# Patient Record
Sex: MALE | Race: WHITE | NOT HISPANIC OR LATINO | Employment: FULL TIME | URBAN - METROPOLITAN AREA
[De-identification: names, ages, dates, MRNs, and addresses within clinical notes are randomized per-mention and may not be internally consistent; named-entity substitution may affect disease eponyms.]

---

## 2020-04-14 ENCOUNTER — HOSPITAL ENCOUNTER (OUTPATIENT)
Dept: RADIOLOGY | Facility: MEDICAL CENTER | Age: 25
End: 2020-04-14
Attending: PHYSICIAN ASSISTANT
Payer: COMMERCIAL

## 2020-04-14 ENCOUNTER — OCCUPATIONAL MEDICINE (OUTPATIENT)
Dept: URGENT CARE | Facility: PHYSICIAN GROUP | Age: 25
End: 2020-04-14
Payer: COMMERCIAL

## 2020-04-14 VITALS
WEIGHT: 280 LBS | BODY MASS INDEX: 37.93 KG/M2 | DIASTOLIC BLOOD PRESSURE: 78 MMHG | OXYGEN SATURATION: 99 % | HEART RATE: 89 BPM | TEMPERATURE: 99 F | SYSTOLIC BLOOD PRESSURE: 144 MMHG | HEIGHT: 72 IN

## 2020-04-14 DIAGNOSIS — M79.641 RIGHT HAND PAIN: ICD-10-CM

## 2020-04-14 DIAGNOSIS — S67.21XA CRUSHING INJURY OF RIGHT HAND, INITIAL ENCOUNTER: ICD-10-CM

## 2020-04-14 PROCEDURE — 73130 X-RAY EXAM OF HAND: CPT | Mod: RT

## 2020-04-14 PROCEDURE — 99203 OFFICE O/P NEW LOW 30 MIN: CPT | Mod: 29 | Performed by: PHYSICIAN ASSISTANT

## 2020-04-14 NOTE — PATIENT INSTRUCTIONS
Compartment Syndrome  You have an injury to the arm or leg with significant deep bruising and swelling. Fractures and muscle bruises cause bleeding in the deep tissues. This can increase the pressure in the injured forearm, leg, hand, or foot. If untreated, the pressure from the bleeding and swelling will interfere with the circulation. If this happens, there is increased pain, numbness, coldness, and a pale color to the hand or foot. Permanent damage to the muscle can occur within 4 to 8 hours if this goes untreated.  This is called a compartment syndrome. It needs immediate medical attention. If the pressure is very high, surgical treatment may be needed. Keep your injured extremity at the level of your heart. Use ice packs as directed by your caregiver.   SEEK IMMEDIATE MEDICAL CARE IF:  · You develop extreme pain unrelieved by pain medicine, ice, and elevation.   · You develop numbness or paralysis (inability to move fingers, toes or ankles) of the hand or foot of the injured extremity.   Document Released: 01/25/2006 Document Revised: 03/11/2013 Document Reviewed: 06/15/2010  [employer]® Patient Information ©2013 [employer], [place].

## 2020-04-14 NOTE — PROGRESS NOTES
Subjective:   Stephen Paredes is a 25 y.o. male who presents for Hand Injury (smashed right hand/ WC )         Date of Injury:  4/14/2020.  Patient complains of right hand pain x1 day.  Symptoms began after the hand was smashed between a cabinet fender while he was lowering a cab.  States that hand was bent at a strange angle.  He has numbness over the area that was smashed.  He denies distal numbness and tingling.  Denies loss of distal sensation.  Wrist is nonpainful.  Range of motion of the hand is significantly restricted.  Pain is currently a 5 out of 10.  He applied ice and took some Advil which did improve both his pain and swelling.  Denies prior injury to the affected hand.  Patient is left-hand dominant.      Review of Systems   Musculoskeletal:        Hand pain and swelling   Neurological:        Numbness over back of hand       PMH: No pertinent past medical history to this problem  MEDS: Medications were reviewed in Epic  ALLERGIES: Allergies were reviewed in Epic  SOCHX: Works at [employer]  FH: No pertinent family history to this problem     Objective:   /78 (BP Location: Left arm, Patient Position: Sitting, BP Cuff Size: Large adult)   Pulse 89   Temp 37.2 °C (99 °F) (Temporal)   Ht 1.829 m (6')   Wt (!) 127 kg (280 lb)   SpO2 99%   BMI 37.97 kg/m²   Physical Exam  Vitals signs reviewed.   Constitutional:       General: He is not in acute distress.     Appearance: Normal appearance. He is well-developed. He is not toxic-appearing.   HENT:      Head: Normocephalic and atraumatic.      Right Ear: External ear normal.      Left Ear: External ear normal.      Nose: Nose normal.   Eyes:      General: Lids are normal.      Conjunctiva/sclera: Conjunctivae normal.   Neck:      Musculoskeletal: Neck supple.   Cardiovascular:      Rate and Rhythm: Normal rate and regular rhythm.   Pulmonary:      Effort: Pulmonary effort is normal. No respiratory distress.      Breath sounds: Normal breath sounds.    Musculoskeletal:      Comments: Right wrist/hand  General: 8mm superficial abrasion on the dorsal aspect.  Diffuse dorsal swelling and mild erythema of the hand.  Significant edema of the thenar eminence. Palpation: Hand diffusely, moderately tender to palpation.  Fingers nontender to palpation.  Wrist nontender to palpation.  ROM: Hand range of motion moderately limited.    Neuro: sensation over dorsal aspect slightly decreased. Otherwise sensation in hand intact and comparable to unaffected hand. Radial, median, ulnar nerves intact.  Vascular: radial pulses 2+ and symmetric, cap refill <2 sec     Skin:     General: Skin is warm and dry.      Capillary Refill: Capillary refill takes less than 2 seconds.   Neurological:      Mental Status: He is alert and oriented to person, place, and time.      Cranial Nerves: No cranial nerve deficit.      Sensory: No sensory deficit.   Psychiatric:         Speech: Speech normal.         Behavior: Behavior normal.         Thought Content: Thought content normal.         Judgment: Judgment normal.          Assessment/Plan:   1. Crushing injury of right hand, initial encounter  - REFERRAL TO ORTHOPEDICS    2. Right hand pain  - DX-HAND 3+ RIGHT; Future  - REFERRAL TO ORTHOPEDICS    Patient is neurovascularly intact distally.  No evidence of fracture on x-ray.  However patient has significant soft tissue swelling.  Additionally, given described injury I am concerned about possible ligament to this or soft tissue injury.  Signs and symptoms of compartment syndrome discussed at length with patient.  He verbalized good understanding of these.  Should he develop any of these he is to go to the ER immediately for reevaluation.  I would like patient to follow-up with a hand specialist for reevaluation.  Urgent referral placed.    Patient placed in volar splint for comfort and a sling to help control swelling.  I would like him to keep elevated and ice frequently.  600 mg of ibuprofen  3-4 times a day as needed for pain and swelling.  Patient to return to clinic in 2 days for reevaluation.    Differential diagnosis, natural history, supportive care, and indications for immediate follow-up discussed.

## 2020-04-14 NOTE — LETTER
EMPLOYEE’S CLAIM FOR COMPENSATION/ REPORT OF INITIAL TREATMENT  FORM C-4    EMPLOYEE’S CLAIM - PROVIDE ALL INFORMATION REQUESTED   First Name  Stephen Last Name  Paredes Birthdate                    1995                Sex  male Claim Number   Home Address  [address] Age  25 y.o. 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 (if applicable)  Troubleshooting Equipment    How did this injury or occupational disease occur? (Be specific an answer in detail. Use additional sheet if necessary)  Was lowering cab and had hand in way of cab smashing hand between cab and fender   If you believe that you have an occupational disease, when did you first have knowledge of the disability and it relationship to your employment?  na Witnesses to the Accident  none      Nature of Injury or Occupational Disease  Workers' Compensation  Part(s) of Body Injured or Affected  Hand (R), N/A, N/A    I certify that the above is true and correct to the best of my knowledge and that I have provided this information in order to obtain the benefits of [place]  I hereby authorize any physician, chiropractor, surgeon, practitioner, or other person, any hospital, including [facility], any medical service organization, any insurance company, or other institution or organization to release to each other, any medical or other information, including benefits paid or payable, pertinent to this injury or disease, except information relative to diagnosis, treatment and/or counseling for AIDS, psychological conditions, alcohol or controlled substances, for which I must give specific authorization.  A Photostat of this authorization shall be as valid as the original.     Date   Place   Employee’s Signature   THIS REPORT MUST BE COMPLETED AND MAILED WITHIN 3 WORKING DAYS OF TREATMENT   Place  [facility]  Name of Facility  [place]   Date  4/14/2020 Diagnosis  (S67.21XA) Crushing injury of right hand, initial encounter  (M79.641) Right hand pain Is there evidence the injured employee was under the influence of alcohol and/or another controlled substance at the time of accident?   Hour  3:15 PM Description of Injury or Disease  Diagnoses of Crushing injury of right hand, initial encounter and Right hand pain were pertinent to this visit. No   Treatment  Splint, sling, strict ED precautions, work restrictions. Ortho eval.  Have you advised the patient to remain off work five days or more?     X-Ray Findings  Negative   If Yes   From Date  To Date      From information given by the employee, together with medical evidence, can you directly connect this injury or occupational disease as job incurred?  Yes If No Full Duty No Modified Duty  Yes   Is additional medical care by a physician indicated?  Yes If Modified Duty, Specify any Limitations / Restrictions  May not use right hand/arm.   Do you know of any previous injury or disease contributing to this condition  "or occupational disease?                            No   Date  4/14/2020 Print Doctor’s Name Hung Lyn P.A.-C. I certify the employer’s copy of  this form was mailed on:   Address  [address] Insurer’s Use Only     [facility] Zip  [address]    Provider’s Tax ID Number  [id number] Telephone  Dept: [phone]            e-Signature: Dr. Rodrick Rajput,   Medical Director Degree  MD        ORIGINAL-TREATING PHYSICIAN OR CHIROPRACTOR    PAGE 2-INSURER/TPA    PAGE 3-EMPLOYER    PAGE 4-EMPLOYEE             Form C-4 (rev.10/07)              BRIEF DESCRIPTION OF RIGHTS AND BENEFITS  (Pursuant to [place] 616C.[place])    Notice of Injury or Occupational Disease (Incident Report Form C-1): If an injury or occupational disease (OD) arises out of and in the course of employment, you must provide written notice to your employer as soon as practicable, but no later than 7 days after the accident or OD. Your employer shall maintain a sufficient supply of the required forms.    Claim for Compensation (Form C-4): If medical treatment is sought, the form C-4 is available at the place of initial treatment. A completed \"Claim for Compensation\" (Form C-4) must be filed within 90 days after an accident or OD. The treating physician or chiropractor must, within 3 working days after treatment, complete and mail to the employer, the employer's insurer and third-party , the Claim for Compensation.    Medical Treatment: If you require medical treatment for your on-the-job injury or OD, you may be required to select a physician or chiropractor from a list provided by your workers’ compensation insurer, if it has contracted with an Organization for Managed Care (MCO) or Preferred Provider Organization (PPO) or providers of health care. If your employer has not entered into a contract with an MCO or PPO, you may select a physician or chiropractor from the Panel of Physicians and Chiropractors. Any medical costs related " to your industrial injury or OD will be paid by your insurer.    Temporary Total Disability (TTD): If your doctor has certified that you are unable to work for a period of at least 5 consecutive days, or 5 cumulative days in a 20-day period, or places restrictions on you that your employer does not accommodate, you may be entitled to TTD compensation.    Temporary Partial Disability (TPD): If the wage you receive upon reemployment is less than the compensation for TTD to which you are entitled, the insurer may be required to pay you TPD compensation to make up the difference. TPD can only be paid for a maximum of 24 months.    Permanent Partial Disability (PPD): When your medical condition is stable and there is an indication of a PPD as a result of your injury or OD, within 30 days, your insurer must arrange for an evaluation by a rating physician or chiropractor to determine the degree of your PPD. The amount of your PPD award depends on the date of injury, the results of the PPD evaluation and your age and wage.    Permanent Total Disability (PTD): If you are medically certified by a treating physician or chiropractor as permanently and totally disabled and have been granted a PTD status by your insurer, you are entitled to receive monthly benefits not to exceed 66 2/3% of your average monthly wage. The amount of your PTD payments is subject to reduction if you previously received a PPD award.    Vocational Rehabilitation Services: You may be eligible for vocational rehabilitation services if you are unable to return to the job due to a permanent physical impairment or permanent restrictions as a result of your injury or occupational disease.    Transportation and Per Miranda Reimbursement: You may be eligible for travel expenses and per miranda associated with medical treatment.    Reopening: You may be able to reopen your claim if your condition worsens after claim closure.    Appeal Process: If you disagree with a  written determination issued by the insurer or the insurer does not respond to your request, you may appeal to the Department of Administration, , by following the instructions contained in your determination letter. You must appeal the determination within 70 days from the date of the determination letter at [address], [address], [place], [place] [address], or [address], [address], [place], [place] [address]. If you disagree with the  decision, you may appeal to the Department of Administration, . You must file your appeal within 30 days from the date of the  decision letter at [address], [address], [place], [place] [address], or [address], [address], [place], [place] [address]. If you disagree with a decision of an , you may file a petition for judicial review with the District Court. You must do so within 30 days of the Appeal Officer’s decision. You may be represented by an  at your own expense or you may contact the [facility] for possible representation.    [place]  for Injured Workers (NAIW): If you disagree with a  decision, you may request that NAIW represent you without charge at an  Hearing. For information regarding denial of benefits, you may contact the [facility] at: [address], [address], [place] [address], (570) [phone], or [address], [address], [place] [address], [phone]    To File a Complaint with the Division: If you wish to file a complaint with the  of the Division of Industrial Relations (DIR),  please contact the Workers’ Compensation Section, [address], [place], [place] [address], telephone (588) [phone], or [address], [address], [place], [place] [address], telephone ([phone]) [phone].    For assistance with Workers’ Compensation Issues: You may contact the Office of  the [facility] Consumer Health Assistance, [address] [place] [address], [address], [place] [address], Toll Free 1-702.830.8122, Web site: http://govcha.[facility].[place].us, E-mail ama@[email or web address].nv.                   __________________________________________________________________                                                     _________        Employee Name / Signature                                                                                                                                              Date                                                                                                                                                                                                     D-2 (rev. 06/18)

## 2020-04-14 NOTE — LETTER
[facility]  [address] [facility] [place] [address]  Phone:  [phone] - Fax:  [phone]   [facility] Progress Report and Disability Certification  Date of Service: 4/14/2020   No Show:  No  Date / Time of Next Visit: 4/16/2020   Claim Information   Patient Name: Stephen Paredes  Claim Number:     Employer:   [employer] Date of Injury: 4/14/2020     Insurer / TPA:    ID / SSN:     Occupation: TECHNICIAN  Diagnosis: Diagnoses of Crushing injury of right hand, initial encounter and Right hand pain were pertinent to this visit.    Medical Information   Related to Industrial Injury? Yes    Subjective Complaints:  Date of Injury:  4/14/2020.  Patient complains of right hand pain x1 day.  Symptoms began after the hand was smashed between a cabinet fender while he was lowering a cab.  States that hand was bent at an unnatural angle.  He has numbness over the area that was smashed.  He denies distal numbness and tingling.  Denies loss of distal sensation.  Wrist is nonpainful.  Range of motion of the hand is significantly restricted.  Pain is currently a 5 out of 10.  He applied ice and took some Advil which did improve his symptoms.  Denies prior injury to the affected hand.  Patient is left-hand dominant.     Objective Findings: Right wrist/hand  General: 8mm superficial abrasion on the dorsal aspect.  Diffuse dorsal swelling and mild erythema of the hand.  Significant edema of the thenar eminence. Palpation: Hand diffusely, moderately tender to palpation.  Fingers nontender to palpation.  Wrist nontender to palpation.  ROM: Hand range of motion moderately limited.    Neuro: sensation over dorsal aspect slightly decreased. Otherwise sensation in hand intact and comparable to unaffected hand. Radial, median, ulnar nerves intact.  Vascular: radial pulses 2+ and symmetric, cap refill <2 sec   Pre-Existing Condition(s):     Assessment:   Initial Visit    Status: Additional Care  Required  Comments:transfer of care to ortho  Permanent Disability:No    Plan:      Diagnostics: X-ray  Comments:negative    Comments:  Patient is neurovascularly intact distally.  No evidence of fracture on x-ray.  However patient has significant soft tissue swelling.  Additionally, given described injury I am concerned about possible ligamentous or other soft tissue injury.  Signs   and symptoms of compartment syndrome discussed at length with patient.  He verbalized good understanding of these.  Should he develop any of these he is to go to the ER immediately for reevaluation.  I would like patient to follow-up with a hand spec  ialist for reevaluation.  Referral placed.    Patient placed in volar splint for comfort and a sling to help control swelling.  I would like him to keep elevated and ice frequently.  600 mg of ibuprofen 3-4 times a day as needed for pain and swelling  .  Patient to return to clinic in 2 days for reevaluation.      Disability Information   Status: Released to Restricted Duty    From:  4/14/2020  Through: 4/16/2020 Restrictions are: Temporary   Physical Restrictions   Sitting:    Standing:    Stooping:    Bending:      Squatting:    Walking:    Climbing:    Pushing:      Pulling:    Other:    Reaching Above Shoulder (L):   Reaching Above Shoulder (R):       Reaching Below Shoulder (L):    Reaching Below Shoulder (R):      Not to exceed Weight Limits   Carrying(hrs):   Weight Limit(lb):   Lifting(hrs):   Weight  Limit(lb):     Comments: No use of right hand and arm for work related tasks. Wear sling while at work. Must be allowed to elevate and ice as needed.    Repetitive Actions   Hands: i.e. Fine Manipulations from Grasping:     Feet: i.e. Operating Foot Controls:     Driving / Operate Machinery:     Physician Name: Hung Lyn P.A.-C. Physician Signature:   e-Signature: Dr. Rodrick Rajput, Medical Director   Clinic Name / Location: [facility] [address]  [address] Clinic Phone Number: Dept: [phone]   Appointment Time: 2:55 Pm Visit Start Time: 3:15 PM   Check-In Time:  3:07 Pm Visit Discharge Time:  4:34 PM   Original-Treating Physician or Chiropractor    Page 2-Insurer/TPA    Page 3-Employer    Page 4-Employee

## 2020-04-16 ENCOUNTER — OCCUPATIONAL MEDICINE (OUTPATIENT)
Dept: URGENT CARE | Facility: PHYSICIAN GROUP | Age: 25
End: 2020-04-16
Payer: COMMERCIAL

## 2020-04-16 VITALS
BODY MASS INDEX: 37.93 KG/M2 | HEART RATE: 61 BPM | RESPIRATION RATE: 15 BRPM | SYSTOLIC BLOOD PRESSURE: 128 MMHG | HEIGHT: 72 IN | DIASTOLIC BLOOD PRESSURE: 88 MMHG | WEIGHT: 280 LBS | OXYGEN SATURATION: 100 % | TEMPERATURE: 97.3 F

## 2020-04-16 DIAGNOSIS — S67.21XD CRUSHING INJURY OF RIGHT HAND, SUBSEQUENT ENCOUNTER: ICD-10-CM

## 2020-04-16 PROCEDURE — 99213 OFFICE O/P EST LOW 20 MIN: CPT | Performed by: NURSE PRACTITIONER

## 2020-04-16 RX ORDER — ACETAMINOPHEN 325 MG/1
650 TABLET ORAL EVERY 4 HOURS PRN
COMMUNITY

## 2020-04-16 ASSESSMENT — ENCOUNTER SYMPTOMS
SENSORY CHANGE: 1
CONSTITUTIONAL NEGATIVE: 1
FOCAL WEAKNESS: 0

## 2020-04-16 ASSESSMENT — PAIN SCALES - GENERAL: PAINLEVEL: 5=MODERATE PAIN

## 2020-04-16 NOTE — LETTER
"   [facility]  [address]  [place] [address]  Phone:  [phone] - Fax:  [phone]   [facility] Progress Report and Disability Certification  Date of Service: 4/16/2020   No Show:  No  Date / Time of Next Visit: 4/21/2020   Claim Information   Patient Name: Stephen Paredes  Claim Number:     Employer: [employer]  Date of Injury: 4/14/2020     Insurer / TPA: [employer] [facility]  ID / SSN:     Occupation: TECHNICIAN  Diagnosis: The encounter diagnosis was Crushing injury of right hand, subsequent encounter.    Medical Information   Related to Industrial Injury? Yes    Subjective Complaints:  Initial visit  4/14/2020:    \"Date of Injury:  4/14/2020.  Patient complains of right hand pain x1 day.  Symptoms began after the hand was smashed between a cabinet fender while he was lowering a cab.  States that hand was bent at a strange angle.  He has numbness over the area that was smashed.  He denies distal numbness and tingling.  Denies loss of distal sensation.  Wrist is nonpainful.  Range of motion of the hand is significantly restricted.  Pain is currently a 5 out of 10.  He applied ice and took some Advil which did improve both his pain and swelling.  Denies prior injury to the affected hand.  Patient is left-hand dominant.\" - KAYLEEN Lyn    Follow-up visit  today 4/16/2020:    Condition mostly unchanged. Pain and swelling unchanged. Reports new numbness of his right thumb. Has been working within restrictions using splint. Has taken Tylenol for pain. Still awaiting hand specialist.   Objective Findings: Constitutional:       General: He is not in acute distress.     Appearance: He is well-developed. He is not toxic-appearing or diaphoretic.   Cardiovascular:      Rate and Rhythm: Normal rate.      Pulses: Normal pulses.   Musculoskeletal:         General: No deformity.      Right wrist: Normal. He exhibits normal range of motion and no " tenderness.      Right hand: He exhibits decreased range of motion, tenderness, laceration (Healing, small abrasion at dorsal R hand) and swelling. He exhibits normal capillary refill and no deformity. Decreased sensation (R thumb) noted. Decreased strength noted.   Skin:     General: Skin is warm and dry.      Capillary Refill: Capillary refill takes less than 2 seconds.      Coloration: Skin is not pale.   Neurological:      General: No focal deficit present.      Mental Status: He is alert and oriented to person, place, and time.      Motor: Motor function is intact.   Pre-Existing Condition(s):     Assessment:   Condition Same    Status: Additional Care Required  Permanent Disability:No    Plan: Consultation    Diagnostics:      Comments:  Condition essentially unchanged. Continue with work restrictions.  May use OTC ibuprofen, ice, and elevation to help with swelling and pain.  Follow-up with hand specialist, referral pending.  Follow-up in clinic in 5 days.  Return sooner if symptoms   change or worsen.    Disability Information   Status: Released to Restricted Duty    From:  2020  Through: 2020 Restrictions are: Temporary   Physical Restrictions   Sitting:    Standing:    Stooping:    Bending:      Squatting:    Walking:    Climbing:    Pushin hrs/day   Pullin hrs/day Other:    Reaching Above Shoulder (L):   Reaching Above Shoulder (R):       Reaching Below Shoulder (L):    Reaching Below Shoulder (R):      Not to exceed Weight Limits   Carrying(hrs):   Weight Limit(lb): < or = to 10 pounds Lifting(hrs):   Weight  Limit(lb): < or = to 10 pounds   Comments: Restrictions apply to R hand only; must use splint and sling at all times    Repetitive Actions   Hands: i.e. Fine Manipulations from [place] hrs/day   Feet: i.e. Operating Foot Controls:     Driving / Operate Machinery:     Physician Name: RICHELLE Foss Physician Signature: BARBARA Donaldson  e-Signature: Dr. Rodrick Rajput, Medical Director   Clinic Name / Location: [address] [place] [address] Clinic Phone Number: Dept: [phone]   Appointment Time: 8:00 Am Visit Start Time: 8:07 AM   Check-In Time:  7:43 Am Visit Discharge Time: 8:40 AM   Original-Treating Physician or Chiropractor    Page 2-Insurer/TPA    Page 3-Employer    Page 4-Employee

## 2020-04-16 NOTE — PROGRESS NOTES
"Subjective:     Stephen Paredes is a 25 y.o. male who presents for Follow-Up ()    Initial visit  4/14/2020:    \"Date of Injury:  4/14/2020.  Patient complains of right hand pain x1 day.  Symptoms began after the hand was smashed between a cabinet fender while he was lowering a cab.  States that hand was bent at a strange angle.  He has numbness over the area that was smashed.  He denies distal numbness and tingling.  Denies loss of distal sensation.  Wrist is nonpainful.  Range of motion of the hand is significantly restricted.  Pain is currently a 5 out of 10.  He applied ice and took some Advil which did improve both his pain and swelling.  Denies prior injury to the affected hand.  Patient is left-hand dominant.\" - KAYLEEN Lyn    Follow-up visit  today 4/16/2020:    Condition mostly unchanged. Pain and swelling unchanged. Reports new numbness of his right thumb. Has been working within restrictions using splint. Has taken Tylenol for pain. Still awaiting hand specialist.    PMH: No pertinent past medical history to this problem  MEDS: Medications were reviewed in Epic  ALLERGIES: Allergies were reviewed in Epic  SOCHX: Works as a technician at [employer]   FH: No pertinent family history to this problem    Review of Systems   Constitutional: Negative.    Musculoskeletal:        R hand pain, swelling   Neurological: Positive for sensory change. Negative for focal weakness.   All other systems reviewed and are negative.    Objective:     /88   Pulse 61   Temp 36.3 °C (97.3 °F)   Resp 15   Ht 1.829 m (6')   Wt (!) 127 kg (280 lb)   SpO2 100%   BMI 37.97 kg/m²     Physical Exam  Vitals signs reviewed.   Constitutional:       General: He is not in acute distress.     Appearance: He is well-developed. He is not toxic-appearing or diaphoretic.   HENT:      Head: Normocephalic.      Right Ear: External ear normal.      Left Ear: External ear normal.      Nose: Nose normal.   Eyes:      Extraocular Movements: " Extraocular movements intact.      Conjunctiva/sclera: Conjunctivae normal.      Pupils: Pupils are equal, round, and reactive to light.   Neck:      Musculoskeletal: Normal range of motion.   Cardiovascular:      Rate and Rhythm: Normal rate.      Pulses: Normal pulses.   Pulmonary:      Effort: Pulmonary effort is normal. No respiratory distress.   Musculoskeletal:         General: No deformity.      Right wrist: Normal. He exhibits normal range of motion and no tenderness.      Right hand: He exhibits decreased range of motion, tenderness, laceration (Healing, small abrasion at dorsal R hand) and swelling. He exhibits normal capillary refill and no deformity. Decreased sensation (R thumb) noted. Decreased strength noted.   Skin:     General: Skin is warm and dry.      Capillary Refill: Capillary refill takes less than 2 seconds.      Coloration: Skin is not pale.   Neurological:      General: No focal deficit present.      Mental Status: He is alert and oriented to person, place, and time.      Motor: Motor function is intact.   Psychiatric:         Behavior: Behavior normal. Behavior is cooperative.       Assessment/Plan:     1. Crushing injury of right hand, subsequent encounter    Splint removed. Hand examined. Hand cleaned. Padding replaced. Splint reapplied. Elastic bandage replaced.    Condition essentially unchanged. Continue with work restrictions.  May use OTC ibuprofen, ice, and elevation to help with swelling and pain.  Follow-up with hand specialist, referral pending.  Follow-up in clinic in 5 days.  Return sooner if symptoms   change or worsen.    Patient advised to: Return in about 5 days (around 4/21/2020).    Differential diagnosis, natural history, supportive care, and indications for immediate follow-up discussed. All questions answered. Patient agrees with the plan of care.

## 2020-04-21 ENCOUNTER — OCCUPATIONAL MEDICINE (OUTPATIENT)
Dept: URGENT CARE | Facility: CLINIC | Age: 25
End: 2020-04-21
Payer: COMMERCIAL

## 2020-04-21 VITALS
WEIGHT: 285.94 LBS | DIASTOLIC BLOOD PRESSURE: 84 MMHG | SYSTOLIC BLOOD PRESSURE: 128 MMHG | OXYGEN SATURATION: 97 % | RESPIRATION RATE: 14 BRPM | HEART RATE: 80 BPM | BODY MASS INDEX: 38.73 KG/M2 | HEIGHT: 72 IN

## 2020-04-21 DIAGNOSIS — S67.21XD CRUSHING INJURY OF RIGHT HAND, SUBSEQUENT ENCOUNTER: ICD-10-CM

## 2020-04-21 PROCEDURE — 99213 OFFICE O/P EST LOW 20 MIN: CPT | Performed by: NURSE PRACTITIONER

## 2020-04-21 ASSESSMENT — ENCOUNTER SYMPTOMS
WEAKNESS: 1
SENSORY CHANGE: 1
MYALGIAS: 1
CARDIOVASCULAR NEGATIVE: 1
PSYCHIATRIC NEGATIVE: 1
RESPIRATORY NEGATIVE: 1
CONSTITUTIONAL NEGATIVE: 1

## 2020-04-21 ASSESSMENT — PAIN SCALES - GENERAL: PAINLEVEL: 2=MINIMAL-SLIGHT

## 2020-04-21 NOTE — LETTER
[facility]  [address], [address] [place] [address]  Phone:  [phone] - Fax:  [phone]   [facility] Progress Report and Disability Certification  Date of Service: 4/21/2020   No Show:  No  Date / Time of Next Visit:   Discharged / Care Transfer to Hand Surgery   Claim Information   Patient Name: Stephen Paredes  Claim Number:     Employer: [employer]  Date of Injury: 4/14/2020     Insurer / TPA: [employer]  ID / SSN:     Occupation: TECHNICIAN  Diagnosis: The encounter diagnosis was Crushing injury of right hand, subsequent encounter.    Medical Information   Related to Industrial Injury? Yes    Subjective Complaints:  Date of Injury:  4/14/2020.  Patient complains of right hand pain x1 day.  Symptoms began after the hand was smashed between a cabinet fender while he was lowering a cab.  States that hand was bent at a strange angle.  He has numbness over the area that was smashed.  He denies distal numbness and tingling.  Denies loss of distal sensation.  Wrist is nonpainful.  Range of motion of the hand is significantly restricted.  Pain is currently a 5 out of 10.  He applied ice and took some Advil which did improve both his pain and swelling.  Denies prior injury to the affected hand.  Patient is left-hand dominant.    Today no improvement.  Pain is described as sharp, radiating, and throbbing.  Patient also reports numbness in his thumb palm on his right hand. Patient has no pertinent negatives at this time. Patient is taking Tylenol and using ice with mild to moderate relief of symptoms. Patient is wearing an orthoglass splint with mild relief. I suspect patient may have some tendon damage. Patient has been referred to Dr. Keller for further follow-up and management. Patient has been tolerating work restrictions at this time. Plan of care discussed with patient.    Objective Findings: Right hand: Pos decreased range of  motion, moderate tenderness, laceration (Healing, small abrasion at dorsal R hand) and swelling. Capillary refill brisk. No obvious deformity. Pos decreased sensation (R thumb) noted. decreased strength noted overall.   Pre-Existing Condition(s):     Assessment:   Condition Same    Status: Discharged / Care Transfer  Permanent Disability:     Plan: Transfer Care    Diagnostics:      Comments:  Follow-up in 2 weeks, if not seen by Dr. Keller  Transfer care to hand surgery, referral placed and pending  Restricted duty, per hand surgery  Continue with OTC Tylenol as needed for symptom relief  Continue with ice and elevation as needed  Continue   with mild gentle range of motion and stretching exercises as tolerated        Disability Information   Status: Released to Restricted Duty    From:  2020  Through:   Restrictions are: Temporary   Physical Restrictions   Sitting:    Standing:    Stooping:    Bending:      Squatting:    Walking:    Climbing:    Pushin hrs/day  Comments:No use of right hand   Pullin hrs/day  Comments:No use of right hand Other:    Reaching Above Shoulder (L):   Reaching Above Shoulder (R):       Reaching Below Shoulder (L):    Reaching Below Shoulder (R):      Not to exceed Weight Limits   Carrying(hrs):   Weight Limit(lb):   Comments:No use of right hand Lifting(hrs):   Weight  Limit(lb):   Comments:No use of right hand   Comments:      Repetitive Actions   Hands: i.e. Fine Manipulations from [place] hrs/day  Comments:No use of right hand   Feet: i.e. Operating Foot Controls:     Driving / Operate Machinery:     Physician Name: RICHELLE Claudio Physician Signature:   e-Signature: Dr. Rodrick Rajput, Medical Director   Clinic Name / Location: [address], [address] [address] Clinic Phone Number: Dept: [phone]   Appointment Time: 7:30 Am Visit Start Time: 7:34 AM   Check-In Time:  7:34 Am Visit Discharge Time: 7:56 am       Original-Treating Physician or Chiropractor    Page 2-Insurer/TPA    Page 3-Employer    Page 4-Employee

## 2020-04-21 NOTE — PROGRESS NOTES
Subjective:      Stephen Paredes is a 25 y.o. male who presents with Other (WC DOI 04/14/2020 Rt Hand - Better - RM 06)      Date of Injury:  4/14/2020.  Patient complains of right hand pain x1 day.  Symptoms began after the hand was smashed between a cabinet fender while he was lowering a cab.  States that hand was bent at a strange angle.  He has numbness over the area that was smashed.  He denies distal numbness and tingling.  Denies loss of distal sensation.  Wrist is nonpainful.  Range of motion of the hand is significantly restricted.  Pain is currently a 5 out of 10.  He applied ice and took some Advil which did improve both his pain and swelling.  Denies prior injury to the affected hand.  Patient is left-hand dominant.    Today no improvement.  Pain is described as sharp, radiating, and throbbing.  Patient also reports numbness in his thumb palm on his right hand. Patient has no pertinent negatives at this time. Patient is taking Tylenol and using ice with mild to moderate relief of symptoms. Patient is wearing an orthoglass splint with mild relief. I suspect patient may have some tendon damage. Patient has been referred to Dr. Keller for further follow-up and management. Patient has been tolerating work restrictions at this time. Plan of care discussed with patient.      HPI    Review of Systems   Constitutional: Negative.    Respiratory: Negative.    Cardiovascular: Negative.    Musculoskeletal: Positive for joint pain and myalgias.   Skin:        Swelling, bruising, and small abrasion healing   Neurological: Positive for sensory change and weakness.   Psychiatric/Behavioral: Negative.         ROS: All systems were reviewed on intake form, form was reviewed and signed. See scanned documents in media. Pertinent positives and negatives included in HPI.    PMH: No pertinent past medical history to this problem  MEDS: Medications were reviewed in Epic  ALLERGIES:   Allergies   Allergen Reactions   • Amoxicillin     • Penicillins      SOCHX: Works as  at [employer]  FH: No pertinent family history to this problem   Objective:     /84   Pulse 80   Resp 14   Ht 1.829 m (6')   Wt (!) 129.7 kg (285 lb 15 oz)   SpO2 97%   BMI 38.78 kg/m²      Physical Exam  Constitutional:       General: He is not in acute distress.     Appearance: Normal appearance. He is not ill-appearing.   Cardiovascular:      Rate and Rhythm: Normal rate and regular rhythm.      Pulses: Normal pulses.   Pulmonary:      Effort: Pulmonary effort is normal.   Musculoskeletal:         General: Swelling, tenderness and signs of injury present. No deformity.   Skin:     General: Skin is warm and dry.      Capillary Refill: Capillary refill takes less than 2 seconds.      Findings: Bruising present. No erythema.   Neurological:      General: No focal deficit present.      Mental Status: He is alert and oriented to person, place, and time.      Cranial Nerves: No cranial nerve deficit.      Sensory: Sensory deficit present.      Motor: Weakness present.      Coordination: Coordination normal.      Gait: Gait normal.      Deep Tendon Reflexes: Reflexes normal.   Psychiatric:         Mood and Affect: Mood normal.         Behavior: Behavior normal.         Right hand: Pos decreased range of motion, moderate tenderness, laceration (Healing, small abrasion at dorsal R hand) and swelling. Capillary refill brisk. No obvious deformity. Pos decreased sensation (R thumb) noted. decreased strength noted overall.       Assessment/Plan:       1. Crushing injury of right hand, subsequent encounter      Follow-up in 2 weeks, if not seen by Dr. Keller   Transfer care to hand surgery, referral placed and pending   Restricted duty, per hand surgery   Continue with OTC Tylenol as needed for symptom relief   Continue with ice and elevation as needed   Continue with mild gentle range of motion and stretching exercises as tolerated

## 2021-03-08 LAB
ABO GROUP BLD: NORMAL
RH BLD: NORMAL

## 2021-03-08 PROCEDURE — 36415 COLL VENOUS BLD VENIPUNCTURE: CPT

## 2021-03-08 PROCEDURE — 86901 BLOOD TYPING SEROLOGIC RH(D): CPT

## 2021-03-08 PROCEDURE — 86900 BLOOD TYPING SEROLOGIC ABO: CPT

## 2021-03-10 PROCEDURE — 86901 BLOOD TYPING SEROLOGIC RH(D): CPT

## 2024-02-28 ENCOUNTER — HOSPITAL ENCOUNTER (OUTPATIENT)
Dept: LAB | Facility: MEDICAL CENTER | Age: 29
End: 2024-02-28
Attending: NURSE PRACTITIONER
Payer: COMMERCIAL

## 2024-02-28 LAB
ALBUMIN SERPL BCP-MCNC: 4.1 G/DL (ref 3.2–4.9)
ALBUMIN/GLOB SERPL: 1.3 G/DL
ALP SERPL-CCNC: 67 U/L (ref 30–99)
ALT SERPL-CCNC: 33 U/L (ref 2–50)
ANION GAP SERPL CALC-SCNC: 12 MMOL/L (ref 7–16)
AST SERPL-CCNC: 26 U/L (ref 12–45)
BASOPHILS # BLD AUTO: 0.6 % (ref 0–1.8)
BASOPHILS # BLD: 0.05 K/UL (ref 0–0.12)
BILIRUB SERPL-MCNC: 0.5 MG/DL (ref 0.1–1.5)
BUN SERPL-MCNC: 20 MG/DL (ref 8–22)
CALCIUM ALBUM COR SERPL-MCNC: 9.6 MG/DL (ref 8.5–10.5)
CALCIUM SERPL-MCNC: 9.7 MG/DL (ref 8.5–10.5)
CHLORIDE SERPL-SCNC: 102 MMOL/L (ref 96–112)
CHOLEST SERPL-MCNC: 193 MG/DL (ref 100–199)
CO2 SERPL-SCNC: 26 MMOL/L (ref 20–33)
CREAT SERPL-MCNC: 0.87 MG/DL (ref 0.5–1.4)
EOSINOPHIL # BLD AUTO: 0.18 K/UL (ref 0–0.51)
EOSINOPHIL NFR BLD: 2.3 % (ref 0–6.9)
ERYTHROCYTE [DISTWIDTH] IN BLOOD BY AUTOMATED COUNT: 42.2 FL (ref 35.9–50)
FASTING STATUS PATIENT QL REPORTED: NORMAL
GFR SERPLBLD CREATININE-BSD FMLA CKD-EPI: 120 ML/MIN/1.73 M 2
GLOBULIN SER CALC-MCNC: 3.1 G/DL (ref 1.9–3.5)
GLUCOSE SERPL-MCNC: 87 MG/DL (ref 65–99)
HCT VFR BLD AUTO: 47.5 % (ref 42–52)
HDLC SERPL-MCNC: 47 MG/DL
HGB BLD-MCNC: 16.1 G/DL (ref 14–18)
IMM GRANULOCYTES # BLD AUTO: 0.02 K/UL (ref 0–0.11)
IMM GRANULOCYTES NFR BLD AUTO: 0.3 % (ref 0–0.9)
LDLC SERPL CALC-MCNC: 136 MG/DL
LYMPHOCYTES # BLD AUTO: 1.53 K/UL (ref 1–4.8)
LYMPHOCYTES NFR BLD: 19.7 % (ref 22–41)
MCH RBC QN AUTO: 30.5 PG (ref 27–33)
MCHC RBC AUTO-ENTMCNC: 33.9 G/DL (ref 32.3–36.5)
MCV RBC AUTO: 90 FL (ref 81.4–97.8)
MONOCYTES # BLD AUTO: 0.9 K/UL (ref 0–0.85)
MONOCYTES NFR BLD AUTO: 11.6 % (ref 0–13.4)
NEUTROPHILS # BLD AUTO: 5.08 K/UL (ref 1.82–7.42)
NEUTROPHILS NFR BLD: 65.5 % (ref 44–72)
NRBC # BLD AUTO: 0 K/UL
NRBC BLD-RTO: 0 /100 WBC (ref 0–0.2)
PLATELET # BLD AUTO: 257 K/UL (ref 164–446)
PMV BLD AUTO: 9.4 FL (ref 9–12.9)
POTASSIUM SERPL-SCNC: 4.5 MMOL/L (ref 3.6–5.5)
PROT SERPL-MCNC: 7.2 G/DL (ref 6–8.2)
RBC # BLD AUTO: 5.28 M/UL (ref 4.7–6.1)
SODIUM SERPL-SCNC: 140 MMOL/L (ref 135–145)
TRIGL SERPL-MCNC: 48 MG/DL (ref 0–149)
WBC # BLD AUTO: 7.8 K/UL (ref 4.8–10.8)

## 2024-02-28 PROCEDURE — 36415 COLL VENOUS BLD VENIPUNCTURE: CPT

## 2024-02-28 PROCEDURE — 84402 ASSAY OF FREE TESTOSTERONE: CPT

## 2024-02-28 PROCEDURE — 80053 COMPREHEN METABOLIC PANEL: CPT

## 2024-02-28 PROCEDURE — 85025 COMPLETE CBC W/AUTO DIFF WBC: CPT

## 2024-02-28 PROCEDURE — 80061 LIPID PANEL: CPT

## 2024-02-28 PROCEDURE — 84270 ASSAY OF SEX HORMONE GLOBUL: CPT

## 2024-02-28 PROCEDURE — 84403 ASSAY OF TOTAL TESTOSTERONE: CPT

## 2024-03-01 LAB
SHBG SERPL-SCNC: 16 NMOL/L (ref 17–56)
TESTOST FREE MFR SERPL: 2.5 % (ref 1.6–2.9)
TESTOST FREE SERPL-MCNC: 108 PG/ML (ref 47–244)
TESTOST SERPL-MCNC: 426 NG/DL (ref 300–1080)

## 2025-01-13 ENCOUNTER — HOSPITAL ENCOUNTER (OUTPATIENT)
Dept: LAB | Facility: MEDICAL CENTER | Age: 30
End: 2025-01-13
Attending: NURSE PRACTITIONER
Payer: COMMERCIAL

## 2025-01-13 LAB
ALBUMIN SERPL BCP-MCNC: 4.3 G/DL (ref 3.2–4.9)
ALBUMIN/GLOB SERPL: 1.6 G/DL
ALP SERPL-CCNC: 72 U/L (ref 30–99)
ALT SERPL-CCNC: 24 U/L (ref 2–50)
ANION GAP SERPL CALC-SCNC: 11 MMOL/L (ref 7–16)
AST SERPL-CCNC: 19 U/L (ref 12–45)
BASOPHILS # BLD AUTO: 0.5 % (ref 0–1.8)
BASOPHILS # BLD: 0.05 K/UL (ref 0–0.12)
BILIRUB SERPL-MCNC: 0.2 MG/DL (ref 0.1–1.5)
BUN SERPL-MCNC: 18 MG/DL (ref 8–22)
CALCIUM ALBUM COR SERPL-MCNC: 9.2 MG/DL (ref 8.5–10.5)
CALCIUM SERPL-MCNC: 9.4 MG/DL (ref 8.5–10.5)
CHLORIDE SERPL-SCNC: 101 MMOL/L (ref 96–112)
CHOLEST SERPL-MCNC: 171 MG/DL (ref 100–199)
CO2 SERPL-SCNC: 25 MMOL/L (ref 20–33)
CREAT SERPL-MCNC: 0.91 MG/DL (ref 0.5–1.4)
EOSINOPHIL # BLD AUTO: 0.17 K/UL (ref 0–0.51)
EOSINOPHIL NFR BLD: 1.6 % (ref 0–6.9)
ERYTHROCYTE [DISTWIDTH] IN BLOOD BY AUTOMATED COUNT: 41.8 FL (ref 35.9–50)
GFR SERPLBLD CREATININE-BSD FMLA CKD-EPI: 116 ML/MIN/1.73 M 2
GLOBULIN SER CALC-MCNC: 2.7 G/DL (ref 1.9–3.5)
GLUCOSE SERPL-MCNC: 96 MG/DL (ref 65–99)
HCT VFR BLD AUTO: 44.6 % (ref 42–52)
HDLC SERPL-MCNC: 47 MG/DL
HGB BLD-MCNC: 14.5 G/DL (ref 14–18)
IMM GRANULOCYTES # BLD AUTO: 0.03 K/UL (ref 0–0.11)
IMM GRANULOCYTES NFR BLD AUTO: 0.3 % (ref 0–0.9)
LDLC SERPL CALC-MCNC: 108 MG/DL
LYMPHOCYTES # BLD AUTO: 2.21 K/UL (ref 1–4.8)
LYMPHOCYTES NFR BLD: 20.7 % (ref 22–41)
MCH RBC QN AUTO: 30 PG (ref 27–33)
MCHC RBC AUTO-ENTMCNC: 32.5 G/DL (ref 32.3–36.5)
MCV RBC AUTO: 92.3 FL (ref 81.4–97.8)
MONOCYTES # BLD AUTO: 0.81 K/UL (ref 0–0.85)
MONOCYTES NFR BLD AUTO: 7.6 % (ref 0–13.4)
NEUTROPHILS # BLD AUTO: 7.43 K/UL (ref 1.82–7.42)
NEUTROPHILS NFR BLD: 69.3 % (ref 44–72)
NRBC # BLD AUTO: 0 K/UL
NRBC BLD-RTO: 0 /100 WBC (ref 0–0.2)
PLATELET # BLD AUTO: 292 K/UL (ref 164–446)
PMV BLD AUTO: 9 FL (ref 9–12.9)
POTASSIUM SERPL-SCNC: 4.5 MMOL/L (ref 3.6–5.5)
PROT SERPL-MCNC: 7 G/DL (ref 6–8.2)
RBC # BLD AUTO: 4.83 M/UL (ref 4.7–6.1)
SODIUM SERPL-SCNC: 137 MMOL/L (ref 135–145)
TESTOST SERPL-MCNC: 243 NG/DL (ref 175–781)
TRIGL SERPL-MCNC: 79 MG/DL (ref 0–149)
WBC # BLD AUTO: 10.7 K/UL (ref 4.8–10.8)

## 2025-01-13 PROCEDURE — 80061 LIPID PANEL: CPT

## 2025-01-13 PROCEDURE — 84402 ASSAY OF FREE TESTOSTERONE: CPT

## 2025-01-13 PROCEDURE — 80053 COMPREHEN METABOLIC PANEL: CPT

## 2025-01-13 PROCEDURE — 85025 COMPLETE CBC W/AUTO DIFF WBC: CPT

## 2025-01-13 PROCEDURE — 36415 COLL VENOUS BLD VENIPUNCTURE: CPT

## 2025-01-13 PROCEDURE — 84403 ASSAY OF TOTAL TESTOSTERONE: CPT

## 2025-01-16 LAB — TESTOST FREE SERPL-MCNC: 69.8 PG/ML (ref 47–244)
